# Patient Record
Sex: MALE
[De-identification: names, ages, dates, MRNs, and addresses within clinical notes are randomized per-mention and may not be internally consistent; named-entity substitution may affect disease eponyms.]

---

## 2017-03-25 ENCOUNTER — RX RENEWAL (OUTPATIENT)
Age: 82
End: 2017-03-25

## 2017-04-24 ENCOUNTER — RX RENEWAL (OUTPATIENT)
Age: 82
End: 2017-04-24

## 2017-05-16 ENCOUNTER — APPOINTMENT (OUTPATIENT)
Dept: CARDIOLOGY | Facility: CLINIC | Age: 82
End: 2017-05-16

## 2017-05-16 VITALS — SYSTOLIC BLOOD PRESSURE: 130 MMHG | DIASTOLIC BLOOD PRESSURE: 78 MMHG

## 2017-05-16 VITALS
BODY MASS INDEX: 31.08 KG/M2 | HEART RATE: 68 BPM | DIASTOLIC BLOOD PRESSURE: 91 MMHG | WEIGHT: 198 LBS | SYSTOLIC BLOOD PRESSURE: 153 MMHG | OXYGEN SATURATION: 92 % | HEIGHT: 67 IN

## 2017-09-11 ENCOUNTER — OTHER (OUTPATIENT)
Age: 82
End: 2017-09-11

## 2017-09-21 ENCOUNTER — APPOINTMENT (OUTPATIENT)
Dept: CARDIOLOGY | Facility: CLINIC | Age: 82
End: 2017-09-21
Payer: MEDICARE

## 2017-09-21 PROCEDURE — 93268 ECG RECORD/REVIEW: CPT

## 2017-10-31 ENCOUNTER — APPOINTMENT (OUTPATIENT)
Dept: CARDIOLOGY | Facility: CLINIC | Age: 82
End: 2017-10-31
Payer: MEDICARE

## 2017-10-31 PROCEDURE — 93880 EXTRACRANIAL BILAT STUDY: CPT

## 2017-11-15 ENCOUNTER — APPOINTMENT (OUTPATIENT)
Dept: CARDIOLOGY | Facility: CLINIC | Age: 82
End: 2017-11-15
Payer: MEDICARE

## 2017-11-15 PROCEDURE — 93306 TTE W/DOPPLER COMPLETE: CPT

## 2017-12-05 ENCOUNTER — APPOINTMENT (OUTPATIENT)
Dept: CARDIOLOGY | Facility: CLINIC | Age: 82
End: 2017-12-05
Payer: MEDICARE

## 2017-12-05 PROCEDURE — 93015 CV STRESS TEST SUPVJ I&R: CPT

## 2018-04-02 ENCOUNTER — RX RENEWAL (OUTPATIENT)
Age: 83
End: 2018-04-02

## 2018-05-22 ENCOUNTER — RX RENEWAL (OUTPATIENT)
Age: 83
End: 2018-05-22

## 2018-05-22 ENCOUNTER — MEDICATION RENEWAL (OUTPATIENT)
Age: 83
End: 2018-05-22

## 2018-07-17 ENCOUNTER — APPOINTMENT (OUTPATIENT)
Dept: CARDIOLOGY | Facility: CLINIC | Age: 83
End: 2018-07-17
Payer: MEDICARE

## 2018-07-17 ENCOUNTER — NON-APPOINTMENT (OUTPATIENT)
Age: 83
End: 2018-07-17

## 2018-07-17 VITALS
HEART RATE: 48 BPM | BODY MASS INDEX: 29.82 KG/M2 | SYSTOLIC BLOOD PRESSURE: 146 MMHG | HEIGHT: 67 IN | WEIGHT: 190 LBS | DIASTOLIC BLOOD PRESSURE: 86 MMHG | OXYGEN SATURATION: 97 %

## 2018-07-17 VITALS — SYSTOLIC BLOOD PRESSURE: 128 MMHG | DIASTOLIC BLOOD PRESSURE: 80 MMHG

## 2018-07-17 DIAGNOSIS — R00.2 PALPITATIONS: ICD-10-CM

## 2018-07-17 PROCEDURE — 93000 ELECTROCARDIOGRAM COMPLETE: CPT

## 2018-07-17 PROCEDURE — 99215 OFFICE O/P EST HI 40 MIN: CPT

## 2018-08-15 ENCOUNTER — RX RENEWAL (OUTPATIENT)
Age: 83
End: 2018-08-15

## 2019-04-09 ENCOUNTER — APPOINTMENT (OUTPATIENT)
Dept: CARDIOLOGY | Facility: CLINIC | Age: 84
End: 2019-04-09
Payer: MEDICARE

## 2019-04-09 ENCOUNTER — NON-APPOINTMENT (OUTPATIENT)
Age: 84
End: 2019-04-09

## 2019-04-09 VITALS
OXYGEN SATURATION: 95 % | HEART RATE: 52 BPM | HEIGHT: 67 IN | SYSTOLIC BLOOD PRESSURE: 148 MMHG | BODY MASS INDEX: 29.82 KG/M2 | DIASTOLIC BLOOD PRESSURE: 92 MMHG | WEIGHT: 190 LBS

## 2019-04-09 DIAGNOSIS — I49.1 ATRIAL PREMATURE DEPOLARIZATION: ICD-10-CM

## 2019-04-09 PROCEDURE — 93000 ELECTROCARDIOGRAM COMPLETE: CPT

## 2019-04-09 PROCEDURE — 99215 OFFICE O/P EST HI 40 MIN: CPT

## 2019-04-09 RX ORDER — ASPIRIN 81 MG
81 TABLET, DELAYED RELEASE (ENTERIC COATED) ORAL DAILY
Refills: 3 | Status: ACTIVE | COMMUNITY

## 2019-04-09 NOTE — PHYSICAL EXAM
[General Appearance - In No Acute Distress] : no acute distress [No Oral Pallor] : no oral pallor [Normal Conjunctiva] : the conjunctiva exhibited no abnormalities [No Jugular Venous Christiansen A Waves] : no jugular venous christiansen A waves [Normal Jugular Venous A Waves Present] : normal jugular venous A waves present [Normal Jugular Venous V Waves Present] : normal jugular venous V waves present [Respiration, Rhythm And Depth] : normal respiratory rhythm and effort [Auscultation Breath Sounds / Voice Sounds] : lungs were clear to auscultation bilaterally [Bowel Sounds] : normal bowel sounds [Abdomen Tenderness] : non-tender [Abnormal Walk] : normal gait [] : no rash [Cyanosis, Localized] : no localized cyanosis [Oriented To Time, Place, And Person] : oriented to person, place, and time [Not Palpable] : not palpable [No Precordial Heave] : no precordial heave was noted [Bradycardia] : bradycardic [Normal S1] : normal S1 [Rhythm Regular] : regular [Normal S2] : normal S2 [No Gallop] : no gallop heard [I] : a grade 1 [2+] : right 2+ [No Abnormalities] : the abdominal aorta was not enlarged and no bruit was heard [No Pitting Edema] : no pitting edema present [FreeTextEntry1] : moderately overweight white male [Apical Thrill] : no thrill palpable at the apex [Click] : no click [Pericardial Rub] : no pericardial rub [Right Carotid Bruit] : no bruit heard over the right carotid [Left Carotid Bruit] : no bruit heard over the left carotid

## 2019-04-09 NOTE — DISCUSSION/SUMMARY
[FreeTextEntry1] : Mr. Gohsh has been stable from a cardiac symptomatic standpoint since his previous visit here on 7/17/18. Specifically, he does not describe having experienced any signs or symptoms to suggest the development of an anginal syndrome, congestive heart failure, a hemodynamically-compromising arrhythmia, or recognized recurrence of paroxysmal atrial fibrillation. His cardiac examination today is unchanged from his previous visit with me, again remarkable for a soft systolic ejection murmur. His blood pressure was elevated upon initial presentation to the office today, however, a follow-up measurement obtained after his examination revealed the reading to be normal. His electrocardiogram today reveals sinus bradycardia with 2 supraventricular contractions, a mildly leftward axis, non-specific early R-wave transition in lead V2, and non-specific poor R wave progression in leads V5 and V6, essentially unchanged from his previous office tracing, allowing for lead placement variation.\par \par I have again reviewed the findings of the cardiac rhythm event monitor transmissions of 9/21/17 - 10/23/17 in detail with the patient today, and specifically discussing the finding of atrial premature contractions. I have explained to the patient that this finding is considered to be a benign arrhythmia, and that no further evaluation and/or treatment is warranted for this arrhythmia.\par \par I have again reviewed the findings of the exercise stress test of 12/5/17, the echocardiogram of 11/15/17, and the carotid artery Doppler study of 10/31/17 in detail with the patient today.\par \par The importance of dietary modification, weight loss, and continued regular exercise was again discussed with the patient today.\par \par I have recommended to the patient that he continue on his present cardiac medications as prescribed for the time being, including Tambocor and Toprol-XL. I have again discussed the potential benefit of anticoagulation in reducing the risk of stroke associated with possible asymptomatic recurrence of paroxysmal atrial fibrillation, however, the patient declines being restarted on anticoagulation, and will continue on aspirin therapy for the time being.\par \par I have asked the patient to have a copy of his next fasting lipid profile and chemistry screen results forwarded to my office for my review and records, so that I may evaluate the efficacy of the present dosage of Lipitor in optimizing his lipid profile, as well as to check for any potential adverse effects on the liver.\par \par I have asked the patient to call me if he should have any questions or problems pertaining to these matters, and especially if he should experience any concerning symptoms or suspected recurrence of paroxysmal atrial fibrillation. I have otherwise asked him to return to the office for follow-up cardiac evaluation in 6 months, provided he remains clinically stable in the interim.  At that time, arrangements will be made for follow-up exercise stress testing, echocardiography and carotid artery Doppler testing. If he has not had follow-up blood testing performed by that time, arrangements will be made for a follow-up fasting lipid profile and chemistry screen as well.

## 2019-04-09 NOTE — HISTORY OF PRESENT ILLNESS
[FreeTextEntry1] : Mr. Quinton Ghosh presented to the office today for follow-up cardiac evaluation.\par \par The patient is an 83-year-old male with a history of paroxysmal atrial fibrillation, having occurred several years ago, for which he has been maintained on treatment with Tambocor and beta-blocker therapy in an effort to suppress recurrence of paroxysmal atrial fibrillation. He had been anticoagulated with Coumadin in the past, however, he was subsequently switched to aspirin therapy, in view of the absence of recognized recurrence of paroxysmal atrial fibrillation. Note that event monitoring had been performed prior to discontinuing anticoagulation, and sinus rhythm was exhibited on all occasions.  Although I had discussed the possibility of asymptomatic recurrence of paroxysmal atrial fibrillation with the patient, he insisted that he would recognize recurrence of this arrhythmia in view of his previous symptoms associated with the arrhythmia, and he elected not to be continued on Coumadin. His CHADS score is 1-2, noting his age, as well as exhibiting labile hypertension on occasion. I last evaluated the patient in the office on 7/17/18.\par \par The patient has been stable from a cardiac symptomatic standpoint his previous visit here on 7/17/18. Specifically, he has not experienced recurrence of the "tickling" sensation in his chest, which had previously been found to correlate with supraventricular ectopy. He has not experienced any sustained episodes of palpitations, and specifically, he has not experienced  recognized recurrence of paroxysmal atrial fibrillation. He has not experienced chest discomfort or dyspnea on exertion in association with his activities, noting that he continues to exercise regularly (treadmill) at a gym. He has not experienced any episodes of presyncope or syncope. He has not noted orthopnea, paroxysmal nocturnal dyspnea, or lower extremity edema.\par \par Exercise stress testing most recently performed on 12/5/17 revealed the patient to exhibit excellent exercise tolerance with an appropriate heart rate and blood pressure response to exercise. The study was negative for the inducement of cardiac symptoms, electrocardiographic evidence of myocardial ischemia, or significant arrhythmias.\par \par Echocardiography most recently performed on 11/15/17 revealed normal cardiac chamber sizes with normal left ventricular wall thickness and wall motion. Left ventricular systolic function was normal, with an estimated ejection fraction of 60%. There was evidence for mild left ventricular diastolic dysfunction. Mild aortic regurgitation was demonstrated. Mild tricuspid regurgitation was demonstrated, with an estimated right ventricular systolic pressure of 41 mm of mercury.\par \par Carotid artery Doppler testing most recently performed on 10/31/17 revealed mild plaque formation involving the bulbar regions bilaterally and mild to moderate plaque formation involving the proximal portions of the internal carotid arteries bilaterally. No significant stenoses were demonstrated.\par \par Cardiac rhythm event monitoring most recently performed from 9/21/17 through 10/23/17 revealed isolated supraventricular ectopy when the patient reported experiencing a "tickling" and/or "fluttering" sensation in his chest.\par \par Previous History:\par \par The patient had telephoned me on 9/11/17, and reported having been experiencing an increased frequency of the previously described "tickle" sensation in his chest, which had previously correlated with atrial ectopy on event monitoring in August of 2016. He was concerned about the increased frequency of his symptoms, however, and hence, follow-up event monitoring was performed, which again revealed the "tickle" sensation to be assisted with isolated atrial ectopy.\par \par At the time of a previous visit here on 6/1/16, the patient reported that he continued to to infrequently experience a randomly-ooccurring brief "tickling" sensation in his chest,, associated with very brief pauses in his pulse at the time. I issued him a cardiac rhythm  loop recording event monitor, which he wore from 7/21/16 through 8/20/16. He transmitted his rhythm while experiencing this "tickling" sensation on 8/4/16 and 8/12/16, and on both occasions, her sensation correlated with atrial premature contractions.\par \par As far as risk factors for coronary artery disease are concerned, the patient has a history of a dyslipidemia, for which he is being treated with statin therapy. He does not have a history of diabetes or sustained hypertension. He denies a history of cigarette smoking. He does not have an immediate family history of premature coronary artery disease.\par \par Past medical history is otherwise significant for esophagitis in the past, right total knee replacement surgery several years ago, and bilateral cataract surgeries (left eye on 5/9/16 and right thigh on 5/23/16).

## 2019-04-22 ENCOUNTER — RX RENEWAL (OUTPATIENT)
Age: 84
End: 2019-04-22

## 2019-08-19 ENCOUNTER — RX RENEWAL (OUTPATIENT)
Age: 84
End: 2019-08-19

## 2019-08-29 ENCOUNTER — TRANSCRIPTION ENCOUNTER (OUTPATIENT)
Age: 84
End: 2019-08-29

## 2019-11-14 ENCOUNTER — OTHER (OUTPATIENT)
Age: 84
End: 2019-11-14

## 2019-11-27 ENCOUNTER — APPOINTMENT (OUTPATIENT)
Dept: CARDIOLOGY | Facility: CLINIC | Age: 84
End: 2019-11-27
Payer: MEDICARE

## 2019-11-27 PROCEDURE — 93015 CV STRESS TEST SUPVJ I&R: CPT

## 2019-12-03 ENCOUNTER — APPOINTMENT (OUTPATIENT)
Dept: CARDIOLOGY | Facility: CLINIC | Age: 84
End: 2019-12-03
Payer: MEDICARE

## 2019-12-03 PROCEDURE — 93880 EXTRACRANIAL BILAT STUDY: CPT

## 2019-12-04 ENCOUNTER — APPOINTMENT (OUTPATIENT)
Dept: CARDIOLOGY | Facility: CLINIC | Age: 84
End: 2019-12-04
Payer: MEDICARE

## 2019-12-04 PROCEDURE — 93306 TTE W/DOPPLER COMPLETE: CPT

## 2020-04-06 ENCOUNTER — RX RENEWAL (OUTPATIENT)
Age: 85
End: 2020-04-06

## 2020-09-04 ENCOUNTER — APPOINTMENT (OUTPATIENT)
Dept: CT IMAGING | Facility: CLINIC | Age: 85
End: 2020-09-04
Payer: MEDICARE

## 2020-09-04 ENCOUNTER — OUTPATIENT (OUTPATIENT)
Dept: OUTPATIENT SERVICES | Facility: HOSPITAL | Age: 85
LOS: 1 days | End: 2020-09-04

## 2020-09-04 DIAGNOSIS — Z00.8 ENCOUNTER FOR OTHER GENERAL EXAMINATION: ICD-10-CM

## 2020-09-04 PROCEDURE — 70491 CT SOFT TISSUE NECK W/DYE: CPT | Mod: 26

## 2020-09-09 ENCOUNTER — RX RENEWAL (OUTPATIENT)
Age: 85
End: 2020-09-09

## 2020-10-01 ENCOUNTER — APPOINTMENT (OUTPATIENT)
Dept: CARDIOLOGY | Facility: CLINIC | Age: 85
End: 2020-10-01
Payer: MEDICARE

## 2020-10-01 ENCOUNTER — NON-APPOINTMENT (OUTPATIENT)
Age: 85
End: 2020-10-01

## 2020-10-01 VITALS
WEIGHT: 189 LBS | SYSTOLIC BLOOD PRESSURE: 126 MMHG | OXYGEN SATURATION: 94 % | HEIGHT: 67 IN | HEART RATE: 76 BPM | BODY MASS INDEX: 29.66 KG/M2 | DIASTOLIC BLOOD PRESSURE: 81 MMHG

## 2020-10-01 VITALS
HEART RATE: 62 BPM | BODY MASS INDEX: 30.61 KG/M2 | SYSTOLIC BLOOD PRESSURE: 190 MMHG | WEIGHT: 195 LBS | DIASTOLIC BLOOD PRESSURE: 90 MMHG | HEIGHT: 67 IN | OXYGEN SATURATION: 94 %

## 2020-10-01 DIAGNOSIS — R03.0 ELEVATED BLOOD-PRESSURE READING, W/OUT DIAGNOSIS OF HYPERTENSION: ICD-10-CM

## 2020-10-01 PROCEDURE — 93000 ELECTROCARDIOGRAM COMPLETE: CPT

## 2020-10-01 PROCEDURE — 99215 OFFICE O/P EST HI 40 MIN: CPT

## 2020-10-06 ENCOUNTER — APPOINTMENT (OUTPATIENT)
Dept: CARDIOLOGY | Facility: CLINIC | Age: 85
End: 2020-10-06
Payer: MEDICARE

## 2020-10-06 PROCEDURE — 93790 AMBL BP MNTR W/SW I&R: CPT

## 2020-11-11 ENCOUNTER — NON-APPOINTMENT (OUTPATIENT)
Age: 85
End: 2020-11-11

## 2020-11-11 ENCOUNTER — APPOINTMENT (OUTPATIENT)
Dept: CARDIOLOGY | Facility: CLINIC | Age: 85
End: 2020-11-11
Payer: MEDICARE

## 2020-11-11 VITALS
BODY MASS INDEX: 30.76 KG/M2 | DIASTOLIC BLOOD PRESSURE: 83 MMHG | SYSTOLIC BLOOD PRESSURE: 153 MMHG | HEART RATE: 58 BPM | WEIGHT: 196 LBS | HEIGHT: 67 IN | OXYGEN SATURATION: 95 %

## 2020-11-11 PROCEDURE — 93000 ELECTROCARDIOGRAM COMPLETE: CPT

## 2020-11-11 PROCEDURE — 99215 OFFICE O/P EST HI 40 MIN: CPT

## 2020-11-18 ENCOUNTER — RX RENEWAL (OUTPATIENT)
Age: 85
End: 2020-11-18

## 2020-12-08 ENCOUNTER — NON-APPOINTMENT (OUTPATIENT)
Age: 85
End: 2020-12-08

## 2020-12-08 ENCOUNTER — APPOINTMENT (OUTPATIENT)
Dept: CARDIOLOGY | Facility: CLINIC | Age: 85
End: 2020-12-08
Payer: MEDICARE

## 2020-12-08 VITALS
RESPIRATION RATE: 14 BRPM | HEIGHT: 67 IN | HEART RATE: 59 BPM | DIASTOLIC BLOOD PRESSURE: 88 MMHG | SYSTOLIC BLOOD PRESSURE: 181 MMHG | WEIGHT: 192 LBS | BODY MASS INDEX: 30.13 KG/M2 | OXYGEN SATURATION: 97 %

## 2020-12-08 PROCEDURE — 93000 ELECTROCARDIOGRAM COMPLETE: CPT

## 2020-12-08 PROCEDURE — 99215 OFFICE O/P EST HI 40 MIN: CPT

## 2020-12-11 LAB
25(OH)D3 SERPL-MCNC: 26.4 NG/ML
ALBUMIN SERPL ELPH-MCNC: 4.6 G/DL
ALP BLD-CCNC: 98 U/L
ALT SERPL-CCNC: 15 U/L
ANION GAP SERPL CALC-SCNC: 11 MMOL/L
AST SERPL-CCNC: 15 U/L
BILIRUB SERPL-MCNC: 1.1 MG/DL
BUN SERPL-MCNC: 18 MG/DL
CALCIUM SERPL-MCNC: 9.2 MG/DL
CHLORIDE SERPL-SCNC: 103 MMOL/L
CHOLEST SERPL-MCNC: 156 MG/DL
CO2 SERPL-SCNC: 27 MMOL/L
CREAT SERPL-MCNC: 1.08 MG/DL
ESTIMATED AVERAGE GLUCOSE: 120 MG/DL
GLUCOSE SERPL-MCNC: 109 MG/DL
HBA1C MFR BLD HPLC: 5.8 %
HDLC SERPL-MCNC: 49 MG/DL
LDLC SERPL CALC-MCNC: 72 MG/DL
NONHDLC SERPL-MCNC: 107 MG/DL
POTASSIUM SERPL-SCNC: 4.5 MMOL/L
PROT SERPL-MCNC: 6.8 G/DL
SODIUM SERPL-SCNC: 140 MMOL/L
TRIGL SERPL-MCNC: 174 MG/DL
TSH SERPL-ACNC: 1.47 UIU/ML

## 2021-06-01 ENCOUNTER — APPOINTMENT (OUTPATIENT)
Dept: CARDIOLOGY | Facility: CLINIC | Age: 86
End: 2021-06-01
Payer: MEDICARE

## 2021-06-01 ENCOUNTER — NON-APPOINTMENT (OUTPATIENT)
Age: 86
End: 2021-06-01

## 2021-06-01 VITALS
HEART RATE: 56 BPM | OXYGEN SATURATION: 96 % | DIASTOLIC BLOOD PRESSURE: 70 MMHG | BODY MASS INDEX: 30.45 KG/M2 | HEIGHT: 67 IN | WEIGHT: 194 LBS | SYSTOLIC BLOOD PRESSURE: 115 MMHG

## 2021-06-01 PROCEDURE — 93000 ELECTROCARDIOGRAM COMPLETE: CPT

## 2021-06-01 PROCEDURE — 99215 OFFICE O/P EST HI 40 MIN: CPT

## 2021-08-26 ENCOUNTER — RX RENEWAL (OUTPATIENT)
Age: 86
End: 2021-08-26

## 2021-11-23 ENCOUNTER — RX RENEWAL (OUTPATIENT)
Age: 86
End: 2021-11-23

## 2022-02-25 ENCOUNTER — RX RENEWAL (OUTPATIENT)
Age: 87
End: 2022-02-25

## 2022-03-24 ENCOUNTER — RX RENEWAL (OUTPATIENT)
Age: 87
End: 2022-03-24

## 2022-09-01 ENCOUNTER — RX RENEWAL (OUTPATIENT)
Age: 87
End: 2022-09-01

## 2022-11-17 ENCOUNTER — APPOINTMENT (OUTPATIENT)
Dept: CARDIOLOGY | Facility: CLINIC | Age: 87
End: 2022-11-17

## 2022-11-17 ENCOUNTER — RX RENEWAL (OUTPATIENT)
Age: 87
End: 2022-11-17

## 2022-11-17 ENCOUNTER — NON-APPOINTMENT (OUTPATIENT)
Age: 87
End: 2022-11-17

## 2022-11-17 VITALS
BODY MASS INDEX: 28.19 KG/M2 | SYSTOLIC BLOOD PRESSURE: 156 MMHG | OXYGEN SATURATION: 97 % | DIASTOLIC BLOOD PRESSURE: 73 MMHG | HEART RATE: 58 BPM | WEIGHT: 180 LBS

## 2022-11-17 VITALS — DIASTOLIC BLOOD PRESSURE: 70 MMHG | SYSTOLIC BLOOD PRESSURE: 118 MMHG

## 2022-11-17 PROCEDURE — 93000 ELECTROCARDIOGRAM COMPLETE: CPT

## 2022-11-17 PROCEDURE — 99214 OFFICE O/P EST MOD 30 MIN: CPT

## 2022-11-17 NOTE — HISTORY OF PRESENT ILLNESS
[FreeTextEntry1] : Mr. Quinton Ghosh presented to the office today for follow-up cardiac evaluation. He was last evaluated by Dr Bowles in the office on 6/1/21.\par \par The patient is an 87-year-old male with a history of paroxysmal atrial fibrillation, having occurred several years ago, for which he has been maintained on treatment with Tambocor and beta-blocker therapy in an effort to suppress recurrence of paroxysmal atrial fibrillation. He had been anticoagulated with Coumadin in the past, however, he was subsequently switched to aspirin therapy, in view of the absence of recognized recurrence of paroxysmal atrial fibrillation. Note that event monitoring had been performed prior to discontinuing anticoagulation, and sinus rhythm was exhibited on all occasions.  Although Dr Bowles had discussed the possibility of asymptomatic recurrence of paroxysmal atrial fibrillation with the patient, he insisted that he would recognize recurrence of this arrhythmia in view of his previous symptoms associated with the arrhythmia, and he elected not to be continued on Coumadin. His CHADS score is 2, noting his age and hypertension. He also has a history of mild aortic stenosis, mild aortic insufficiency, mild mitral regurgitation, mild tricuspid regurgitation, moderate carotid atherosclerosis, a dyslipidemia, pre-diabetes, and vitamin D deficiency.\par \par The patient has been stable from a cardiac symptomatic standpoint his previous visit here on 6/1/2021. \par  He has not experienced any sustained episodes of palpitations, and specifically, he has not experienced  recognized recurrence of paroxysmal atrial fibrillation. He has not experienced chest discomfort or dyspnea on exertion in association with his activities. He has not experienced any episodes of presyncope or syncope.\par  He has not noted orthopnea, paroxysmal nocturnal dyspnea, or lower extremity edema.\par \par At the time of a previous visit here in 10/1/20, Dr Bowles referred the patient for a 24-hour ambulatory blood pressure monitoring study to assess his blood pressure control. This study was performed on 10/6/20, revealing an average reading of 138/85, with 46% of all systolic readings being in the elevated range and 28% of all diastolic readings being in the elevated range. Dr Bowles discussed these findings on the telephone the patient on 10/14/20, at which time Benicar 20 mg daily was initiated. The dosage of Benicar was increased to 20 mg twice daily at the time of a follow-up visit here on 11/11/20, in an effort to more optimally control the patient's blood pressure.  HCTZ 12.5 mg daily was added at the time of a follow-up visit here on 12/8/20.  The patient has since been monitoring his blood pressure readings at home, and describes the readings as having been consistently running in the normal range.\par \par Laboratory studies performed on 12/11/20 revealed cholesterol 156, triglycerides 174, HDL 49, and calculated LDL 72.  The liver chemistries were normal.  The BUN and creatinine were 18 and 1.08, respectively.  The potassium level was 4.5.  The glucose level is 109 and the hemoglobin A1c level was 5.8%.  The vitamin D level was 26.4.  The TSH level was 1.47.\par \par Exercise stress testing most recently performed on 11/17/19 revealed the patient to exhibit excellent exercise tolerance with an appropriate heart rate and blood pressure response to exercise. The study was negative for the inducement of cardiac symptoms, electrocardiographic evidence of myocardial ischemia, or significant arrhythmias.\par \par Echocardiography most recently performed on 12/4/19 revealed the left atrium to be mildly enlarged. The remainder of the cardiac chambers were normal in dimension. Left ventricular wall thickness and wall motion were normal, with an estimated ejection fraction of 60%. Mild left ventricular diastolic dysfunction is demonstrated. Mild aortic stenosis (peak gradient 18 mm mercury, mean gradient 8 mmHg, estimated JORDAN 1.8 cm²) was demonstrated. Mild aortic insufficiency was demonstrated. Mild mitral regurgitation was demonstrated. Mild tricuspid regurgitation is demonstrated, with an estimated right ventricular systolic pressure of 45 mm mercury.\par \par Carotid artery Doppler testing most recently performed on 12/3/19 revealed mild plaque formation involving the bulbar regions bilaterally and mild to moderate plaque formation involving the proximal portions of the internal carotid arteries bilaterally. No significant stenoses were demonstrated.\par \par Cardiac rhythm event monitoring most recently performed from 9/21/17 through 10/23/17 revealed isolated supraventricular ectopy when the patient reported experiencing a "tickling" and/or "fluttering" sensation in his chest.\par \par Previous History:\par \par The patient had telephoned me on 9/11/17, and reported having been experiencing an increased frequency of the previously described "tickle" sensation in his chest, which had previously correlated with atrial ectopy on event monitoring in August of 2016. He was concerned about the increased frequency of his symptoms, however, and hence, follow-up event monitoring was performed, which again revealed the "tickle" sensation to be assisted with isolated atrial ectopy.\par \par At the time of a previous visit here on 6/1/16, the patient reported that he continued to to infrequently experience a randomly-ooccurring brief "tickling" sensation in his chest,, associated with very brief pauses in his pulse at the time. I issued him a cardiac rhythm  loop recording event monitor, which he wore from 7/21/16 through 8/20/16. He transmitted his rhythm while experiencing this "tickling" sensation on 8/4/16 and 8/12/16, and on both occasions, her sensation correlated with atrial premature contractions.\par \par As far as risk factors for coronary artery disease are concerned, the patient has a history of a dyslipidemia, for which he is being treated with statin therapy. He does not have a history of diabetes or sustained hypertension. He denies a history of cigarette smoking. He does not have an immediate family history of premature coronary artery disease.\par \par Past medical history is otherwise significant for esophagitis in the past, prostate carcinoma (status post radical prostatectomy several years ago), right total knee replacement surgery several years ago, and bilateral cataract surgeries (left eye on 5/9/16 and right thigh on 5/23/16).

## 2022-11-17 NOTE — CARDIOLOGY SUMMARY
[de-identified] : 11/17/22: Sinus bradycardia at a rate of 53 bpm.  DISPLAY PLAN FREE TEXT DISPLAY PLAN FREE TEXT DISPLAY PLAN FREE TEXT DISPLAY PLAN FREE TEXT DISPLAY PLAN FREE TEXT DISPLAY PLAN FREE TEXT DISPLAY PLAN FREE TEXT DISPLAY PLAN FREE TEXT DISPLAY PLAN FREE TEXT DISPLAY PLAN FREE TEXT DISPLAY PLAN FREE TEXT DISPLAY PLAN FREE TEXT DISPLAY PLAN FREE TEXT DISPLAY PLAN FREE TEXT

## 2022-11-17 NOTE — DISCUSSION/SUMMARY
[FreeTextEntry1] : Mr. Ghosh has been stable from a cardiac symptomatic standpoint since his previous visit here on 6/1/21. Specifically, he does not describe having experienced any signs or symptoms to suggest the development of an anginal syndrome, congestive heart failure, a hemodynamically-compromising arrhythmia, or recognized recurrence of paroxysmal atrial fibrillation. His cardiac examination today is unchanged from his previous visit with Dr Bowles, again remarkable for a soft systolic ejection murmur. His blood pressure initially was 156/73 and by the end of the visit dropped to 118/70 . His electrocardiogram today reveals sinus bradycardia, at a rate of 53 bpm, essentially unchanged from his previous office tracing, allowing for lead placement variation.\par \par As his home blood pressure readings have consistently been running in the normal range since his previous visit here, as well as the fact that his reading in the office today is normal, I have instructed the patient to continue his antihypertensive therapy as presently prescribed for the time being.\par \par \par \par The importance of dietary modification, weight loss, and continued regular exercise was again discussed with the patient today.\par \par I have recommended to the patient that he continue on his present cardiac medications as prescribed for the time being, including Tambocor and Toprol-XL. I have again discussed the potential benefit of anticoagulation in reducing the risk of stroke associated with possible asymptomatic recurrence of paroxysmal atrial fibrillation, however, the patient again declines being restarted on anticoagulation, and will continue on aspirin therapy for the time being.\par \par I have asked the patient to call me if he should have any questions or problems pertaining to these matters, and especially if he should experience any concerning symptoms, suspected recurrence of paroxysmal atrial fibrillation, or if he should note persistently elevated home blood pressure readings. I have otherwise asked him to return to the office for follow-up cardiac evaluation and blood pressure reassessment in 6 months to see Dr Bowles when he comes back from [place] for the winter, provided he remains clinically stable in the interim.  Prior to going to [place] for the winter I have arranged follow-up exercise stress testing, echocardiography and carotid artery Doppler testing. I will call him with the results of these tests. Further recommendations will be based on his clinical course. [EKG obtained to assist in diagnosis and management of assessed problem(s)] : EKG obtained to assist in diagnosis and management of assessed problem(s)

## 2022-11-29 ENCOUNTER — APPOINTMENT (OUTPATIENT)
Dept: CARDIOLOGY | Facility: CLINIC | Age: 87
End: 2022-11-29

## 2022-11-29 ENCOUNTER — MED ADMIN CHARGE (OUTPATIENT)
Age: 87
End: 2022-11-29

## 2022-11-29 PROCEDURE — 93306 TTE W/DOPPLER COMPLETE: CPT

## 2022-11-29 PROCEDURE — 93880 EXTRACRANIAL BILAT STUDY: CPT

## 2022-11-29 RX ADMIN — PERFLUTREN MG/ML: 6.52 INJECTION, SUSPENSION INTRAVENOUS at 00:00

## 2022-12-01 ENCOUNTER — APPOINTMENT (OUTPATIENT)
Dept: CARDIOLOGY | Facility: CLINIC | Age: 87
End: 2022-12-01

## 2022-12-01 PROCEDURE — 93015 CV STRESS TEST SUPVJ I&R: CPT

## 2022-12-01 RX ORDER — PERFLUTREN 6.52 MG/ML
6.52 INJECTION, SUSPENSION INTRAVENOUS
Qty: 1 | Refills: 0 | Status: COMPLETED | OUTPATIENT
Start: 2022-11-29

## 2022-12-29 ENCOUNTER — NON-APPOINTMENT (OUTPATIENT)
Age: 87
End: 2022-12-29

## 2023-02-13 ENCOUNTER — RX RENEWAL (OUTPATIENT)
Age: 88
End: 2023-02-13

## 2023-02-21 ENCOUNTER — RX RENEWAL (OUTPATIENT)
Age: 88
End: 2023-02-21

## 2023-03-03 ENCOUNTER — RX RENEWAL (OUTPATIENT)
Age: 88
End: 2023-03-03

## 2023-05-31 ENCOUNTER — APPOINTMENT (OUTPATIENT)
Dept: CARDIOLOGY | Facility: CLINIC | Age: 88
End: 2023-05-31
Payer: MEDICARE

## 2023-05-31 ENCOUNTER — NON-APPOINTMENT (OUTPATIENT)
Age: 88
End: 2023-05-31

## 2023-05-31 VITALS
SYSTOLIC BLOOD PRESSURE: 126 MMHG | WEIGHT: 186 LBS | HEART RATE: 58 BPM | OXYGEN SATURATION: 100 % | DIASTOLIC BLOOD PRESSURE: 78 MMHG | BODY MASS INDEX: 29.19 KG/M2 | HEIGHT: 67 IN

## 2023-05-31 DIAGNOSIS — R00.1 BRADYCARDIA, UNSPECIFIED: ICD-10-CM

## 2023-05-31 PROCEDURE — 93000 ELECTROCARDIOGRAM COMPLETE: CPT

## 2023-05-31 PROCEDURE — 99215 OFFICE O/P EST HI 40 MIN: CPT

## 2023-07-18 NOTE — PHYSICAL EXAM
Lm for pt to c/b [General Appearance - In No Acute Distress] : no acute distress [Normal Conjunctiva] : the conjunctiva exhibited no abnormalities [No Oral Pallor] : no oral pallor [Normal Jugular Venous A Waves Present] : normal jugular venous A waves present [Normal Jugular Venous V Waves Present] : normal jugular venous V waves present [No Jugular Venous Christiansen A Waves] : no jugular venous christiansen A waves [Respiration, Rhythm And Depth] : normal respiratory rhythm and effort [Auscultation Breath Sounds / Voice Sounds] : lungs were clear to auscultation bilaterally [Bowel Sounds] : normal bowel sounds [Abdomen Tenderness] : non-tender [Abnormal Walk] : normal gait [Cyanosis, Localized] : no localized cyanosis [] : no rash [Oriented To Time, Place, And Person] : oriented to person, place, and time [Not Palpable] : not palpable [No Precordial Heave] : no precordial heave was noted [Bradycardia] : bradycardic [Rhythm Regular] : regular [Normal S1] : normal S1 [Normal S2] : normal S2 [No Gallop] : no gallop heard [I] : a grade 1 [2+] : left 2+ [No Abnormalities] : the abdominal aorta was not enlarged and no bruit was heard [No Pitting Edema] : no pitting edema present [FreeTextEntry1] : moderately overweight white male [Apical Thrill] : no thrill palpable at the apex [Click] : no click [Pericardial Rub] : no pericardial rub [Right Carotid Bruit] : no bruit heard over the right carotid [Left Carotid Bruit] : no bruit heard over the left carotid

## 2023-09-18 ENCOUNTER — RX RENEWAL (OUTPATIENT)
Age: 88
End: 2023-09-18

## 2023-09-24 ENCOUNTER — RX RENEWAL (OUTPATIENT)
Age: 88
End: 2023-09-24

## 2023-09-25 ENCOUNTER — RX RENEWAL (OUTPATIENT)
Age: 88
End: 2023-09-25

## 2023-11-28 ENCOUNTER — OUTPATIENT (OUTPATIENT)
Dept: OUTPATIENT SERVICES | Facility: HOSPITAL | Age: 88
LOS: 1 days | End: 2023-11-28
Payer: MEDICARE

## 2023-11-28 ENCOUNTER — APPOINTMENT (OUTPATIENT)
Age: 88
End: 2023-11-28

## 2023-11-28 DIAGNOSIS — D69.6 THROMBOCYTOPENIA, UNSPECIFIED: ICD-10-CM

## 2023-11-28 PROCEDURE — 76700 US EXAM ABDOM COMPLETE: CPT | Mod: 26

## 2023-12-13 ENCOUNTER — APPOINTMENT (OUTPATIENT)
Dept: CARDIOLOGY | Facility: CLINIC | Age: 88
End: 2023-12-13
Payer: MEDICARE

## 2023-12-13 VITALS
SYSTOLIC BLOOD PRESSURE: 132 MMHG | OXYGEN SATURATION: 99 % | DIASTOLIC BLOOD PRESSURE: 79 MMHG | BODY MASS INDEX: 29.66 KG/M2 | HEIGHT: 67 IN | WEIGHT: 189 LBS | HEART RATE: 53 BPM

## 2023-12-13 DIAGNOSIS — E78.5 HYPERLIPIDEMIA, UNSPECIFIED: ICD-10-CM

## 2023-12-13 DIAGNOSIS — I65.29 OCCLUSION AND STENOSIS OF UNSPECIFIED CAROTID ARTERY: ICD-10-CM

## 2023-12-13 DIAGNOSIS — I48.0 PAROXYSMAL ATRIAL FIBRILLATION: ICD-10-CM

## 2023-12-13 DIAGNOSIS — I10 ESSENTIAL (PRIMARY) HYPERTENSION: ICD-10-CM

## 2023-12-13 PROCEDURE — 93000 ELECTROCARDIOGRAM COMPLETE: CPT

## 2023-12-13 PROCEDURE — 99215 OFFICE O/P EST HI 40 MIN: CPT

## 2023-12-13 RX ORDER — OLMESARTAN MEDOXOMIL 20 MG/1
20 TABLET, FILM COATED ORAL
Qty: 180 | Refills: 3 | Status: ACTIVE | COMMUNITY
Start: 2020-10-14 | End: 1900-01-01

## 2023-12-13 RX ORDER — HYDROCHLOROTHIAZIDE 12.5 MG/1
12.5 TABLET ORAL DAILY
Qty: 90 | Refills: 3 | Status: ACTIVE | COMMUNITY
Start: 2020-12-08 | End: 1900-01-01

## 2023-12-13 RX ORDER — FLECAINIDE ACETATE 50 MG/1
50 TABLET ORAL
Qty: 180 | Refills: 3 | Status: ACTIVE | COMMUNITY
Start: 2019-04-22 | End: 1900-01-01

## 2023-12-13 NOTE — CARDIOLOGY SUMMARY
[de-identified] : 12/13/23 -sinus bradycardia at a rate of 52 bpm.  First-degree AV block.  Leftward axis.  Mild intraventricular conduction delay.  Nonspecific diminished voltage in lead V6.

## 2023-12-13 NOTE — DISCUSSION/SUMMARY
[FreeTextEntry1] : Mr. Ghosh has been stable from a cardiac symptomatic standpoint since his previous visit here on 5/31/2023. Specifically, he does not describe having experienced any signs or symptoms to suggest the development of an anginal syndrome, congestive heart failure, a hemodynamically-compromising arrhythmia, or recognized recurrence of paroxysmal atrial fibrillation. His cardiac examination today is unchanged from his previous visit with me, again remarkable for a soft systolic ejection murmur. His blood pressure reading is normal today. His electrocardiogram today reveals sinus bradycardia, mild first-degree AV block, a leftward axis, a mild intraventricular conduction delay, and nonspecific diminished voltage in lead V6, essentially unchanged from his previous office tracing, allowing for lead placement variation.  As his home blood pressure readings have consistently been running in the normal range since his previous visit here, as well as the fact that his reading in the office today is normal, I have instructed the patient to continue his antihypertensive therapy as presently prescribed for the time being.  I have reviewed the findings of the blood test report of 11/7/2023 in detail with the patient today, and I have instructed him to continue Lipitor 40 mg daily for the time being.  I have again reviewed the findings of the cardiac rhythm event monitor transmissions of 9/21/17 - 10/23/17 in detail with the patient today, and specifically discussing the finding of atrial premature contractions. I have explained to the patient that this finding is considered to be a benign arrhythmia, and that no further evaluation and/or treatment is warranted for this arrhythmia.  I have reviewed the findings of the exercise stress test of 12/1/2022, the echocardiogram of 11/29/2022, and the carotid artery Doppler study of 12/3/19 in detail with the patient today.  I am referring the patient for follow-up echocardiography at this point for reassessment of cardiac structural integrity, left ventricular function, valvular morphology and function, and the pulmonary artery systolic pressure.  The patient is going to make arrangements to have this study performed through our office, and I will telephone him to discuss the findings, once the study has been completed.  The importance of dietary modification, weight loss, and continued regular exercise was again discussed with the patient today.  I have recommended to the patient that he continue on his present cardiac medications as prescribed for the time being, including Tambocor and Toprol-XL.  I have electronically prescribed renewals for his cardiac medications to his pharmacy for him today.  I have again discussed the potential benefit of anticoagulation in reducing the risk of stroke associated with possible asymptomatic recurrence of paroxysmal atrial fibrillation, however, the patient declines being restarted on anticoagulation, and will continue on aspirin therapy for the time being.  I have asked the patient to call me if he should have any questions or problems pertaining to these matters, and especially if he should experience any concerning symptoms, suspected recurrence of paroxysmal atrial fibrillation, or if he should note persistently elevated home blood pressure readings. I have otherwise asked him to return to the office for follow-up cardiac evaluation and blood pressure reassessment in 6 months, provided he remains clinically stable in the interim. [EKG obtained to assist in diagnosis and management of assessed problem(s)] : EKG obtained to assist in diagnosis and management of assessed problem(s)

## 2023-12-13 NOTE — HISTORY OF PRESENT ILLNESS
[FreeTextEntry1] : Mr. Quinton Ghosh presented to the office today for follow-up cardiac evaluation.  I last evaluated the patient in the office on 5/31/2023.  The patient is an 88-year-old male with a history of paroxysmal atrial fibrillation, having occurred several years ago, for which he has been maintained on treatment with Tambocor and beta-blocker therapy in an effort to suppress recurrence of paroxysmal atrial fibrillation. He had been anticoagulated with Coumadin in the past, however, he was subsequently switched to aspirin therapy, in view of the absence of recognized recurrence of paroxysmal atrial fibrillation. Note that event monitoring had been performed prior to discontinuing anticoagulation, and sinus rhythm was exhibited on all occasions.  Although I had discussed the possibility of asymptomatic recurrence of paroxysmal atrial fibrillation with the patient, he insisted that he would recognize recurrence of this arrhythmia in view of his previous symptoms associated with the arrhythmia, and he elected not to be continued on Coumadin. His CHADS score is 2, noting his age and hypertension. He also has a history of mild aortic stenosis, mild aortic insufficiency, mild mitral regurgitation, mild tricuspid regurgitation, moderate carotid atherosclerosis, a dyslipidemia, pre-diabetes, and vitamin D deficiency.  The patient has been stable from a cardiac symptomatic standpoint his previous visit here on 5/31/2023. Specifically, he has continued to infrequently experience the "tickling" sensation in his chest, which has previously been found to correlate with supraventricular ectopy. He has not experienced any sustained episodes of palpitations, and specifically, he has not experienced  recognized recurrence of paroxysmal atrial fibrillation. He has not experienced chest discomfort or dyspnea on exertion in association with his activities. He has not experienced any episodes of presyncope or syncope. He has not noted orthopnea, paroxysmal nocturnal dyspnea, or lower extremity edema.  At the time of a previous visit here in 10/1/20, I referred the patient for a 24-hour ambulatory blood pressure monitoring study to assess his blood pressure control. This study was performed on 10/6/20, revealing an average reading of 138/85, with 46% of all systolic readings being in the elevated range and 28% of all diastolic readings being in the elevated range. I discussed these findings on the telephone the patient on 10/14/20, at which time Benicar 20 mg daily was initiated. The dosage of Benicar was increased to 20 mg twice daily at the time of a follow-up visit here on 11/11/20, in an effort to more optimally control the patient's blood pressure.  HCTZ 12.5 mg daily was added at the time of a follow-up visit here on 12/8/20.  The patient has since been monitoring his blood pressure readings at home, and describes the readings as having been consistently running in the normal range.  Laboratory studies performed on 11/7/2023 revealed cholesterol 152, triglycerides 102, HDL 53, and calculated LDL 80.  The liver chemistries were normal.  The BUN and creatinine were 16 and 1.2, respectively.  The potassium level was 5.1.  The glucose level was 105 and the hemoglobin A1c level was 5.7%.  The hemoglobin and hematocrit were 13.3 and 39.3, respectively.  The TSH level was 1.21.  Exercise stress testing most recently performed on 12/1/2022 revealed the patient to exhibit excellent exercise tolerance for his age with an appropriate heart rate and blood pressure response to exercise. The study was negative for the inducement of cardiac symptoms, electrocardiographic evidence of myocardial ischemia, or significant arrhythmias.  Echocardiography most recently performed on 11/29/2022 revealed the left atrium to be moderately enlarged. The remainder of the cardiac chambers were normal in dimension. Left ventricular wall thickness and wall motion were normal.  Moderate left ventricular diastolic dysfunction was demonstrated. Mild aortic stenosis was demonstrated. Mild aortic insufficiency was demonstrated. Mild mitral regurgitation was demonstrated. Mild tricuspid regurgitation is demonstrated, with an estimated right ventricular systolic pressure of 31 mm mercury.  Carotid artery Doppler testing most recently performed on 12/3/19 revealed mild plaque formation involving the bulbar regions bilaterally and mild to moderate plaque formation involving the proximal portions of the internal carotid arteries bilaterally. No significant stenoses were demonstrated.  Cardiac rhythm event monitoring most recently performed from 9/21/17 through 10/23/17 revealed isolated supraventricular ectopy when the patient reported experiencing a "tickling" and/or "fluttering" sensation in his chest.  Previous History:  The patient had telephoned me on 9/11/17, and reported having been experiencing an increased frequency of the previously described "tickle" sensation in his chest, which had previously correlated with atrial ectopy on event monitoring in August of 2016. He was concerned about the increased frequency of his symptoms, however, and hence, follow-up event monitoring was performed, which again revealed the "tickle" sensation to be assisted with isolated atrial ectopy.  At the time of a previous visit here on 6/1/16, the patient reported that he continued to to infrequently experience a randomly-ooccurring brief "tickling" sensation in his chest,, associated with very brief pauses in his pulse at the time. I issued him a cardiac rhythm  loop recording event monitor, which he wore from 7/21/16 through 8/20/16. He transmitted his rhythm while experiencing this "tickling" sensation on 8/4/16 and 8/12/16, and on both occasions, her sensation correlated with atrial premature contractions.  As far as risk factors for coronary artery disease are concerned, the patient has a history of a dyslipidemia, for which he is being treated with statin therapy. He does not have a history of diabetes or sustained hypertension. He denies a history of cigarette smoking. He does not have an immediate family history of premature coronary artery disease.  Past medical history is otherwise significant for esophagitis in the past, prostate carcinoma (status post radical prostatectomy several years ago), right total knee replacement surgery several years ago, and bilateral cataract surgeries (left eye on 5/9/16 and right thigh on 5/23/16).

## 2023-12-13 NOTE — PHYSICAL EXAM
[General Appearance - In No Acute Distress] : no acute distress [Normal Conjunctiva] : the conjunctiva exhibited no abnormalities [No Oral Pallor] : no oral pallor [Normal Jugular Venous A Waves Present] : normal jugular venous A waves present [Normal Jugular Venous V Waves Present] : normal jugular venous V waves present [No Jugular Venous Christiansen A Waves] : no jugular venous christiansen A waves [Respiration, Rhythm And Depth] : normal respiratory rhythm and effort [Auscultation Breath Sounds / Voice Sounds] : lungs were clear to auscultation bilaterally [Bowel Sounds] : normal bowel sounds [Abdomen Tenderness] : non-tender [Abnormal Walk] : normal gait [Cyanosis, Localized] : no localized cyanosis [] : no rash [Oriented To Time, Place, And Person] : oriented to person, place, and time [Not Palpable] : not palpable [No Precordial Heave] : no precordial heave was noted [Bradycardia] : bradycardic [Rhythm Regular] : regular [Normal S1] : normal S1 [Normal S2] : normal S2 [No Gallop] : no gallop heard [I] : a grade 1 [2+] : left 2+ [No Abnormalities] : the abdominal aorta was not enlarged and no bruit was heard [No Pitting Edema] : no pitting edema present [FreeTextEntry1] : moderately overweight white male [Apical Thrill] : no thrill palpable at the apex [Click] : no click [Pericardial Rub] : no pericardial rub [Right Carotid Bruit] : no bruit heard over the right carotid [Left Carotid Bruit] : no bruit heard over the left carotid

## 2023-12-21 ENCOUNTER — MED ADMIN CHARGE (OUTPATIENT)
Age: 88
End: 2023-12-21

## 2023-12-21 ENCOUNTER — APPOINTMENT (OUTPATIENT)
Dept: CARDIOLOGY | Facility: CLINIC | Age: 88
End: 2023-12-21
Payer: MEDICARE

## 2023-12-21 PROCEDURE — 93306 TTE W/DOPPLER COMPLETE: CPT

## 2023-12-21 RX ADMIN — PERFLUTREN MG/ML: 6.52 INJECTION, SUSPENSION INTRAVENOUS at 00:00

## 2023-12-22 RX ORDER — PERFLUTREN 6.52 MG/ML
6.52 INJECTION, SUSPENSION INTRAVENOUS
Qty: 1 | Refills: 0 | Status: COMPLETED | OUTPATIENT
Start: 2023-12-21

## 2024-09-24 ENCOUNTER — APPOINTMENT (OUTPATIENT)
Dept: CARDIOLOGY | Facility: CLINIC | Age: 89
End: 2024-09-24
Payer: MEDICARE

## 2024-09-24 VITALS
DIASTOLIC BLOOD PRESSURE: 74 MMHG | BODY MASS INDEX: 29.19 KG/M2 | HEIGHT: 67 IN | SYSTOLIC BLOOD PRESSURE: 126 MMHG | HEART RATE: 50 BPM | WEIGHT: 186 LBS

## 2024-09-24 DIAGNOSIS — I10 ESSENTIAL (PRIMARY) HYPERTENSION: ICD-10-CM

## 2024-09-24 DIAGNOSIS — I48.0 PAROXYSMAL ATRIAL FIBRILLATION: ICD-10-CM

## 2024-09-24 DIAGNOSIS — I35.8 OTHER NONRHEUMATIC AORTIC VALVE DISORDERS: ICD-10-CM

## 2024-09-24 DIAGNOSIS — R00.1 BRADYCARDIA, UNSPECIFIED: ICD-10-CM

## 2024-09-24 DIAGNOSIS — E78.5 HYPERLIPIDEMIA, UNSPECIFIED: ICD-10-CM

## 2024-09-24 PROCEDURE — 99215 OFFICE O/P EST HI 40 MIN: CPT

## 2024-09-24 PROCEDURE — G2211 COMPLEX E/M VISIT ADD ON: CPT

## 2024-09-24 PROCEDURE — 93000 ELECTROCARDIOGRAM COMPLETE: CPT

## 2024-09-24 NOTE — DISCUSSION/SUMMARY
[FreeTextEntry1] : Mr. Ghosh has been stable from a cardiac symptomatic standpoint since his previous visit here on 12/13/2023. Specifically, he does not describe having experienced any signs or symptoms to suggest the development of an anginal syndrome, congestive heart failure, a hemodynamically-compromising arrhythmia, or recognized recurrence of paroxysmal atrial fibrillation. His cardiac examination today is unchanged from his previous visit with me, again remarkable for a soft systolic ejection murmur. His blood pressure reading is normal today. His electrocardiogram today reveals sinus bradycardia, first-degree AV block, a leftward axis, and a mild intraventricular conduction delay, essentially unchanged from his previous office tracing, allowing for lead placement variation.  As his home blood pressure readings have consistently been running in the normal range since his previous visit here, as well as the fact that his reading in the office today is normal, I have instructed the patient to continue his antihypertensive therapy as presently prescribed for the time being.  I have reviewed the findings of the blood test report of 11/7/2023 in detail with the patient today, and I have instructed him to continue Lipitor 40 mg daily for the time being.  He anticipates having follow-up blood testing performed through the office of his PCP, Dr. German, next month, and he will have a copy of the results forwarded to my office for my review.  I have again reviewed the findings of the cardiac rhythm event monitor transmissions of 9/21/17 - 10/23/17 in detail with the patient today, and specifically discussing the finding of atrial premature contractions. I have explained to the patient that this finding is considered to be a benign arrhythmia, and that no further evaluation and/or treatment is warranted for this arrhythmia.  I have reviewed the findings of the exercise stress test of 12/1/2022, the echocardiogram of 12/21/2023, and the carotid artery Doppler study of 12/3/19 in detail with the patient today.  I am referring the patient for follow-up echocardiography at this point for reassessment of cardiac structural integrity, left ventricular function, and the degree of aortic stenosis.  The patient is going to make arrangements to have this study performed through our office, and I will telephone him to discuss the findings, once the study has been completed.  The importance of dietary modification, weight loss, and continued regular exercise was again discussed with the patient today.  I have recommended to the patient that he continue his present cardiac medications as presently prescribed for the time being, including Tambocor and Toprol-XL.  I have again discussed the potential benefit of anticoagulation in reducing the risk of stroke associated with possible asymptomatic recurrence of paroxysmal atrial fibrillation, however, the patient declines being restarted on anticoagulation.  I have asked the patient to call me if he should have any questions or problems pertaining to these matters, and especially if he should experience any concerning symptoms, suspected recurrence of paroxysmal atrial fibrillation, or if he should note persistently elevated home blood pressure readings. I have otherwise asked him to return to the office for follow-up cardiac evaluation and blood pressure reassessment in 6 months, provided he remains clinically stable in the interim. [EKG obtained to assist in diagnosis and management of assessed problem(s)] : EKG obtained to assist in diagnosis and management of assessed problem(s)

## 2024-09-24 NOTE — CARDIOLOGY SUMMARY
[de-identified] : 9/25/24 -sinus bradycardia at a rate of 49 bpm.  First-degree AV block.  Leftward axis.  Mild intraventricular conduction delay.

## 2024-09-24 NOTE — HISTORY OF PRESENT ILLNESS
[FreeTextEntry1] : Mr. Quinton Ghosh presented to the office today for follow-up cardiac evaluation.  I last evaluated the patient in the office on 12/13/2023.  The patient is an 89-year-old male with a history of paroxysmal atrial fibrillation, having occurred several years ago, for which he has been maintained on treatment with Tambocor and beta-blocker therapy in an effort to suppress recurrence of paroxysmal atrial fibrillation. He had been anticoagulated with Coumadin in the past, however, he was subsequently switched to aspirin therapy, in view of the absence of recognized recurrence of paroxysmal atrial fibrillation. Note that event monitoring had been performed prior to discontinuing anticoagulation, and sinus rhythm was exhibited on all occasions.  Although I had discussed the possibility of asymptomatic recurrence of paroxysmal atrial fibrillation with the patient, he insisted that he would recognize recurrence of this arrhythmia in view of his previous symptoms associated with the arrhythmia, and he elected not to be continued on Coumadin. His CHADS score is 2, noting his age and hypertension. He also has a history of mild aortic stenosis, mild aortic insufficiency, mild mitral regurgitation, mild tricuspid regurgitation, moderate carotid atherosclerosis, a dyslipidemia, pre-diabetes, and vitamin D deficiency.  The patient has been stable from a cardiac symptomatic standpoint his previous visit here on 12/13/2023. Specifically, he has continued to infrequently experience the "tickling" sensation in his chest, which has previously been found to correlate with supraventricular ectopy. He has not experienced any sustained episodes of palpitations, and specifically, he has not experienced recognized recurrence of paroxysmal atrial fibrillation. He has not experienced chest discomfort or dyspnea on exertion in association with his activities. He has not experienced any episodes of presyncope or syncope. He has not noted orthopnea, paroxysmal nocturnal dyspnea, or lower extremity edema.  At the time of a previous visit here in 10/1/20, I referred the patient for a 24-hour ambulatory blood pressure monitoring study to assess his blood pressure control. This study was performed on 10/6/20, revealing an average reading of 138/85, with 46% of all systolic readings being in the elevated range and 28% of all diastolic readings being in the elevated range. I discussed these findings on the telephone the patient on 10/14/20, at which time Benicar 20 mg daily was initiated. The dosage of Benicar was increased to 20 mg twice daily at the time of a follow-up visit here on 11/11/20, in an effort to more optimally control the patient's blood pressure.  HCTZ 12.5 mg daily was added at the time of a follow-up visit here on 12/8/20.  The patient has since been monitoring his blood pressure readings at home, and describes the readings as having been consistently running in the normal range.  As far as risk factors for coronary artery disease are concerned, the patient has a history of a dyslipidemia, for which he is being treated with statin therapy. He does not have a history of diabetes or sustained hypertension. He denies a history of cigarette smoking. He does not have an immediate family history of premature coronary artery disease.  Laboratory studies performed on 11/7/2023 revealed cholesterol 152, triglycerides 102, HDL 53, and calculated LDL 80.  The liver chemistries were normal.  The BUN and creatinine were 16 and 1.2, respectively.  The potassium level was 5.1.  The glucose level was 105 and the hemoglobin A1c level was 5.7%.  The hemoglobin and hematocrit were 13.3 and 39.3, respectively.  The TSH level was 1.21.  Exercise stress testing most recently performed on 12/1/2022 revealed the patient to exhibit excellent exercise tolerance for his age with an appropriate heart rate and blood pressure response to exercise. The study was negative for the inducement of cardiac symptoms, electrocardiographic evidence of myocardial ischemia, or significant arrhythmias.  Echocardiography most recently performed on 12/21/2023 revealed the left atrium to be mildly dilated.  The remainder of the cardiac chambers were normal in dimension.  Left ventricular wall thickness was normal.  Left ventricular systolic function was normal, with an estimated ejection fraction of 65 to 70%.  Mild left ventricular diastolic dysfunction was demonstrated.  Mild aortic stenosis (peak gradient 19 mmHg, mean gradient 9 mmHg, estimated JORDAN 2.14 cm) was demonstrated.  Mild tricuspid regurgitation was demonstrated, with an estimated PASP of 38 mmHg.  Carotid artery Doppler testing most recently performed on 12/3/19 revealed mild plaque formation involving the bulbar regions bilaterally and mild to moderate plaque formation involving the proximal portions of the internal carotid arteries bilaterally. No significant stenoses were demonstrated.  Cardiac rhythm event monitoring most recently performed from 9/21/17 through 10/23/17 revealed isolated supraventricular ectopy when the patient reported experiencing a "tickling" and/or "fluttering" sensation in his chest.  Previous History:  The patient had telephoned me on 9/11/17, and reported having been experiencing an increased frequency of the previously described "tickle" sensation in his chest, which had previously correlated with atrial ectopy on event monitoring in August of 2016. He was concerned about the increased frequency of his symptoms, however, and hence, follow-up event monitoring was performed, which again revealed the "tickle" sensation to be assisted with isolated atrial ectopy.  At the time of a previous visit here on 6/1/16, the patient reported that he continued to to infrequently experience a randomly-ooccurring brief "tickling" sensation in his chest,, associated with very brief pauses in his pulse at the time. I issued him a cardiac rhythm  loop recording event monitor, which he wore from 7/21/16 through 8/20/16. He transmitted his rhythm while experiencing this "tickling" sensation on 8/4/16 and 8/12/16, and on both occasions, her sensation correlated with atrial premature contractions.  Past medical history is otherwise significant for esophagitis in the past, prostate carcinoma (status post radical prostatectomy several years ago), right total knee replacement surgery several years ago, and bilateral cataract surgeries (left eye on 5/9/16 and right thigh on 5/23/16).

## 2024-09-24 NOTE — CARDIOLOGY SUMMARY
[de-identified] : 9/25/24 -sinus bradycardia at a rate of 49 bpm.  First-degree AV block.  Leftward axis.  Mild intraventricular conduction delay.

## 2024-12-03 ENCOUNTER — APPOINTMENT (OUTPATIENT)
Dept: CARDIOLOGY | Facility: CLINIC | Age: 89
End: 2024-12-03
Payer: MEDICARE

## 2024-12-03 PROCEDURE — 93306 TTE W/DOPPLER COMPLETE: CPT

## 2024-12-09 ENCOUNTER — RX RENEWAL (OUTPATIENT)
Age: 89
End: 2024-12-09

## 2024-12-10 ENCOUNTER — APPOINTMENT (OUTPATIENT)
Dept: CARDIOLOGY | Facility: CLINIC | Age: 89
End: 2024-12-10

## 2024-12-17 ENCOUNTER — RX RENEWAL (OUTPATIENT)
Age: 89
End: 2024-12-17

## 2024-12-18 ENCOUNTER — RX RENEWAL (OUTPATIENT)
Age: 89
End: 2024-12-18

## 2025-05-21 ENCOUNTER — APPOINTMENT (OUTPATIENT)
Dept: CARDIOLOGY | Facility: CLINIC | Age: 89
End: 2025-05-21
Payer: MEDICARE

## 2025-05-21 ENCOUNTER — NON-APPOINTMENT (OUTPATIENT)
Age: 89
End: 2025-05-21

## 2025-05-21 VITALS
HEART RATE: 47 BPM | SYSTOLIC BLOOD PRESSURE: 137 MMHG | HEIGHT: 67 IN | BODY MASS INDEX: 28.56 KG/M2 | DIASTOLIC BLOOD PRESSURE: 80 MMHG | WEIGHT: 182 LBS | OXYGEN SATURATION: 99 %

## 2025-05-21 DIAGNOSIS — I10 ESSENTIAL (PRIMARY) HYPERTENSION: ICD-10-CM

## 2025-05-21 DIAGNOSIS — E78.5 HYPERLIPIDEMIA, UNSPECIFIED: ICD-10-CM

## 2025-05-21 DIAGNOSIS — I35.8 OTHER NONRHEUMATIC AORTIC VALVE DISORDERS: ICD-10-CM

## 2025-05-21 DIAGNOSIS — I48.0 PAROXYSMAL ATRIAL FIBRILLATION: ICD-10-CM

## 2025-05-21 PROCEDURE — G2211 COMPLEX E/M VISIT ADD ON: CPT

## 2025-05-21 PROCEDURE — 99215 OFFICE O/P EST HI 40 MIN: CPT

## 2025-05-21 PROCEDURE — 93000 ELECTROCARDIOGRAM COMPLETE: CPT
